# Patient Record
Sex: FEMALE | Race: WHITE | Employment: UNEMPLOYED | URBAN - METROPOLITAN AREA
[De-identification: names, ages, dates, MRNs, and addresses within clinical notes are randomized per-mention and may not be internally consistent; named-entity substitution may affect disease eponyms.]

---

## 2019-03-31 ENCOUNTER — APPOINTMENT (OUTPATIENT)
Dept: GENERAL RADIOLOGY | Age: 59
DRG: 872 | End: 2019-03-31
Payer: COMMERCIAL

## 2019-03-31 ENCOUNTER — HOSPITAL ENCOUNTER (INPATIENT)
Age: 59
LOS: 2 days | Discharge: HOME | DRG: 872 | End: 2019-04-02
Attending: STUDENT IN AN ORGANIZED HEALTH CARE EDUCATION/TRAINING PROGRAM | Admitting: INTERNAL MEDICINE
Payer: COMMERCIAL

## 2019-03-31 DIAGNOSIS — I95.9 HYPOTENSION, UNSPECIFIED HYPOTENSION TYPE: ICD-10-CM

## 2019-03-31 DIAGNOSIS — A41.9 SEPTICEMIA (HCC): ICD-10-CM

## 2019-03-31 DIAGNOSIS — D72.829 LEUKOCYTOSIS, UNSPECIFIED TYPE: ICD-10-CM

## 2019-03-31 DIAGNOSIS — N17.9 ACUTE RENAL FAILURE, UNSPECIFIED ACUTE RENAL FAILURE TYPE (HCC): Primary | ICD-10-CM

## 2019-03-31 LAB
ALBUMIN SERPL-MCNC: 3.1 G/DL (ref 3.5–4.6)
ALP BLD-CCNC: 114 U/L (ref 40–130)
ALT SERPL-CCNC: 14 U/L (ref 0–33)
ANION GAP SERPL CALCULATED.3IONS-SCNC: 15 MEQ/L (ref 9–15)
ANISOCYTOSIS: ABNORMAL
APTT: 34.3 SEC (ref 21.6–35.4)
AST SERPL-CCNC: 11 U/L (ref 0–35)
BACTERIA: ABNORMAL /HPF
BANDED NEUTROPHILS RELATIVE PERCENT: 8 % (ref 5–11)
BASOPHILS ABSOLUTE: 0 K/UL (ref 0–0.2)
BASOPHILS RELATIVE PERCENT: 0.1 %
BILIRUB SERPL-MCNC: 0.3 MG/DL (ref 0.2–0.7)
BILIRUBIN URINE: NEGATIVE
BLOOD, URINE: NEGATIVE
BUN BLDV-MCNC: 105 MG/DL (ref 8–23)
C-REACTIVE PROTEIN, HIGH SENSITIVITY: >300 MG/L (ref 0–5)
CALCIUM SERPL-MCNC: 11.6 MG/DL (ref 8.5–9.9)
CHLORIDE BLD-SCNC: 99 MEQ/L (ref 95–107)
CHOLESTEROL, TOTAL: 145 MG/DL (ref 0–199)
CLARITY: CLEAR
CO2: 19 MEQ/L (ref 20–31)
COLOR: YELLOW
CREAT SERPL-MCNC: 1.46 MG/DL (ref 0.5–0.9)
EKG ATRIAL RATE: 94 BPM
EKG P AXIS: 14 DEGREES
EKG P-R INTERVAL: 160 MS
EKG Q-T INTERVAL: 342 MS
EKG QRS DURATION: 112 MS
EKG QTC CALCULATION (BAZETT): 427 MS
EKG R AXIS: -25 DEGREES
EKG T AXIS: 33 DEGREES
EKG VENTRICULAR RATE: 94 BPM
EOSINOPHILS ABSOLUTE: 0 K/UL (ref 0–0.7)
EOSINOPHILS RELATIVE PERCENT: 0.7 %
EPITHELIAL CELLS, UA: ABNORMAL /HPF (ref 0–5)
GFR AFRICAN AMERICAN: 44.2
GFR NON-AFRICAN AMERICAN: 36.5
GLOBULIN: 4.3 G/DL (ref 2.3–3.5)
GLUCOSE BLD-MCNC: 105 MG/DL (ref 70–99)
GLUCOSE URINE: NEGATIVE MG/DL
HCT VFR BLD CALC: 31.1 % (ref 37–47)
HDLC SERPL-MCNC: 37 MG/DL (ref 40–59)
HEMOGLOBIN: 10.1 G/DL (ref 12–16)
HYALINE CASTS: ABNORMAL /HPF (ref 0–5)
HYPOCHROMIA: ABNORMAL
INR BLD: 1
KETONES, URINE: NEGATIVE MG/DL
LACTIC ACID, SEPSIS: 1.8 MMOL/L (ref 0.5–1.9)
LACTIC ACID: 1.3 MMOL/L (ref 0.5–2.2)
LDL CHOLESTEROL CALCULATED: 79 MG/DL (ref 0–129)
LEUKOCYTE ESTERASE, URINE: NEGATIVE
LYMPHOCYTES ABSOLUTE: 1.9 K/UL (ref 1–4.8)
LYMPHOCYTES RELATIVE PERCENT: 11 %
MAGNESIUM: 2.4 MG/DL (ref 1.7–2.4)
MCH RBC QN AUTO: 27.8 PG (ref 27–31.3)
MCHC RBC AUTO-ENTMCNC: 32.6 % (ref 33–37)
MCV RBC AUTO: 85.2 FL (ref 82–100)
MONOCYTES ABSOLUTE: 2.4 K/UL (ref 0.2–0.8)
MONOCYTES RELATIVE PERCENT: 11.4 %
NEUTROPHILS ABSOLUTE: 12.8 K/UL (ref 1.4–6.5)
NEUTROPHILS RELATIVE PERCENT: 68 %
NITRITE, URINE: POSITIVE
PDW BLD-RTO: 17.4 % (ref 11.5–14.5)
PH UA: 6.5 (ref 5–9)
PLATELET # BLD: 335 K/UL (ref 130–400)
PLATELET SLIDE REVIEW: ADEQUATE
POTASSIUM SERPL-SCNC: 2.9 MEQ/L (ref 3.4–4.9)
PRO-BNP: 449 PG/ML
PROMONOCYTES: 3 %
PROTEIN UA: ABNORMAL MG/DL
PROTHROMBIN TIME: 10.2 SEC (ref 9–11.5)
RBC # BLD: 3.65 M/UL (ref 4.2–5.4)
RBC UA: ABNORMAL /HPF (ref 0–5)
SLIDE REVIEW: ABNORMAL
SMUDGE CELLS: 2.9
SODIUM BLD-SCNC: 133 MEQ/L (ref 135–144)
SPECIFIC GRAVITY UA: 1.01 (ref 1–1.03)
TOTAL CK: 40 U/L (ref 0–170)
TOTAL PROTEIN: 7.4 G/DL (ref 6.3–8)
TRIGL SERPL-MCNC: 145 MG/DL (ref 0–150)
TROPONIN: <0.01 NG/ML (ref 0–0.01)
TSH SERPL DL<=0.05 MIU/L-ACNC: 0.6 UIU/ML (ref 0.44–3.86)
URINE REFLEX TO CULTURE: YES
UROBILINOGEN, URINE: 0.2 E.U./DL
WBC # BLD: 16.9 K/UL (ref 4.8–10.8)
WBC UA: ABNORMAL /HPF (ref 0–5)

## 2019-03-31 PROCEDURE — 80061 LIPID PANEL: CPT

## 2019-03-31 PROCEDURE — 83880 ASSAY OF NATRIURETIC PEPTIDE: CPT

## 2019-03-31 PROCEDURE — 86141 C-REACTIVE PROTEIN HS: CPT

## 2019-03-31 PROCEDURE — 36415 COLL VENOUS BLD VENIPUNCTURE: CPT

## 2019-03-31 PROCEDURE — 87186 SC STD MICRODIL/AGAR DIL: CPT

## 2019-03-31 PROCEDURE — 80053 COMPREHEN METABOLIC PANEL: CPT

## 2019-03-31 PROCEDURE — 87086 URINE CULTURE/COLONY COUNT: CPT

## 2019-03-31 PROCEDURE — 84484 ASSAY OF TROPONIN QUANT: CPT

## 2019-03-31 PROCEDURE — 85610 PROTHROMBIN TIME: CPT

## 2019-03-31 PROCEDURE — 2580000003 HC RX 258: Performed by: STUDENT IN AN ORGANIZED HEALTH CARE EDUCATION/TRAINING PROGRAM

## 2019-03-31 PROCEDURE — 96365 THER/PROPH/DIAG IV INF INIT: CPT

## 2019-03-31 PROCEDURE — 6360000002 HC RX W HCPCS: Performed by: STUDENT IN AN ORGANIZED HEALTH CARE EDUCATION/TRAINING PROGRAM

## 2019-03-31 PROCEDURE — 81001 URINALYSIS AUTO W/SCOPE: CPT

## 2019-03-31 PROCEDURE — 99284 EMERGENCY DEPT VISIT MOD MDM: CPT

## 2019-03-31 PROCEDURE — 2060000000 HC ICU INTERMEDIATE R&B

## 2019-03-31 PROCEDURE — 82550 ASSAY OF CK (CPK): CPT

## 2019-03-31 PROCEDURE — 85025 COMPLETE CBC W/AUTO DIFF WBC: CPT

## 2019-03-31 PROCEDURE — 85730 THROMBOPLASTIN TIME PARTIAL: CPT

## 2019-03-31 PROCEDURE — 2580000003 HC RX 258: Performed by: INTERNAL MEDICINE

## 2019-03-31 PROCEDURE — 83735 ASSAY OF MAGNESIUM: CPT

## 2019-03-31 PROCEDURE — 87040 BLOOD CULTURE FOR BACTERIA: CPT

## 2019-03-31 PROCEDURE — 6370000000 HC RX 637 (ALT 250 FOR IP): Performed by: INTERNAL MEDICINE

## 2019-03-31 PROCEDURE — 84443 ASSAY THYROID STIM HORMONE: CPT

## 2019-03-31 PROCEDURE — 6360000002 HC RX W HCPCS: Performed by: INTERNAL MEDICINE

## 2019-03-31 PROCEDURE — 93005 ELECTROCARDIOGRAM TRACING: CPT

## 2019-03-31 PROCEDURE — 71045 X-RAY EXAM CHEST 1 VIEW: CPT

## 2019-03-31 PROCEDURE — 83605 ASSAY OF LACTIC ACID: CPT

## 2019-03-31 PROCEDURE — 87077 CULTURE AEROBIC IDENTIFY: CPT

## 2019-03-31 RX ORDER — 0.9 % SODIUM CHLORIDE 0.9 %
1000 INTRAVENOUS SOLUTION INTRAVENOUS ONCE
Status: COMPLETED | OUTPATIENT
Start: 2019-03-31 | End: 2019-03-31

## 2019-03-31 RX ORDER — HYDROCODONE BITARTRATE AND ACETAMINOPHEN 7.5; 325 MG/1; MG/1
1 TABLET ORAL EVERY 6 HOURS PRN
COMMUNITY

## 2019-03-31 RX ORDER — DOXYCYCLINE HYCLATE 50 MG/1
324 CAPSULE, GELATIN COATED ORAL
Status: ON HOLD | COMMUNITY
End: 2019-04-02 | Stop reason: HOSPADM

## 2019-03-31 RX ORDER — HYDROCODONE BITARTRATE AND ACETAMINOPHEN 5; 325 MG/1; MG/1
1 TABLET ORAL EVERY 8 HOURS PRN
Status: DISCONTINUED | OUTPATIENT
Start: 2019-03-31 | End: 2019-04-02 | Stop reason: HOSPADM

## 2019-03-31 RX ORDER — SODIUM CHLORIDE 0.9 % (FLUSH) 0.9 %
10 SYRINGE (ML) INJECTION PRN
Status: DISCONTINUED | OUTPATIENT
Start: 2019-03-31 | End: 2019-04-02 | Stop reason: HOSPADM

## 2019-03-31 RX ORDER — POTASSIUM CHLORIDE 20 MEQ/1
40 TABLET, EXTENDED RELEASE ORAL ONCE
Status: COMPLETED | OUTPATIENT
Start: 2019-03-31 | End: 2019-03-31

## 2019-03-31 RX ORDER — BENAZEPRIL HYDROCHLORIDE 40 MG/1
40 TABLET, FILM COATED ORAL DAILY
COMMUNITY
Start: 2018-10-08 | End: 2019-04-07

## 2019-03-31 RX ORDER — SODIUM CHLORIDE 0.9 % (FLUSH) 0.9 %
10 SYRINGE (ML) INJECTION EVERY 12 HOURS SCHEDULED
Status: DISCONTINUED | OUTPATIENT
Start: 2019-03-31 | End: 2019-04-02 | Stop reason: HOSPADM

## 2019-03-31 RX ORDER — SODIUM CHLORIDE 9 MG/ML
INJECTION, SOLUTION INTRAVENOUS CONTINUOUS
Status: DISCONTINUED | OUTPATIENT
Start: 2019-03-31 | End: 2019-04-01

## 2019-03-31 RX ORDER — ZOLPIDEM TARTRATE 10 MG/1
TABLET ORAL NIGHTLY PRN
Status: ON HOLD | COMMUNITY
End: 2019-04-02 | Stop reason: HOSPADM

## 2019-03-31 RX ORDER — IBUPROFEN 200 MG
200 TABLET ORAL EVERY 6 HOURS PRN
Status: ON HOLD | COMMUNITY
End: 2019-04-02 | Stop reason: HOSPADM

## 2019-03-31 RX ORDER — HYDROCHLOROTHIAZIDE 25 MG/1
25 TABLET ORAL DAILY
Status: ON HOLD | COMMUNITY
Start: 2008-09-29 | End: 2019-04-02 | Stop reason: HOSPADM

## 2019-03-31 RX ADMIN — SODIUM CHLORIDE 1000 ML: 9 INJECTION, SOLUTION INTRAVENOUS at 13:41

## 2019-03-31 RX ADMIN — PIPERACILLIN SODIUM,TAZOBACTAM SODIUM 3.38 G: 3; .375 INJECTION, POWDER, FOR SOLUTION INTRAVENOUS at 13:41

## 2019-03-31 RX ADMIN — POTASSIUM CHLORIDE 40 MEQ: 20 TABLET, EXTENDED RELEASE ORAL at 17:35

## 2019-03-31 RX ADMIN — SODIUM CHLORIDE: 9 INJECTION, SOLUTION INTRAVENOUS at 17:36

## 2019-03-31 RX ADMIN — SODIUM CHLORIDE 1000 ML: 9 INJECTION, SOLUTION INTRAVENOUS at 15:04

## 2019-03-31 RX ADMIN — CEFTRIAXONE SODIUM 1 G: 1 INJECTION, POWDER, FOR SOLUTION INTRAMUSCULAR; INTRAVENOUS at 17:34

## 2019-03-31 RX ADMIN — ENOXAPARIN SODIUM 40 MG: 40 INJECTION SUBCUTANEOUS at 17:35

## 2019-03-31 RX ADMIN — SODIUM CHLORIDE 1000 ML: 9 INJECTION, SOLUTION INTRAVENOUS at 15:06

## 2019-03-31 RX ADMIN — HYDROCODONE BITARTRATE AND ACETAMINOPHEN 1 TABLET: 5; 325 TABLET ORAL at 19:00

## 2019-03-31 ASSESSMENT — ENCOUNTER SYMPTOMS
CHEST TIGHTNESS: 0
COUGH: 0
DIARRHEA: 0
SINUS PRESSURE: 0
TROUBLE SWALLOWING: 0
BACK PAIN: 0
ABDOMINAL PAIN: 0
SHORTNESS OF BREATH: 0
NAUSEA: 1
VOMITING: 1

## 2019-03-31 ASSESSMENT — PAIN SCALES - GENERAL
PAINLEVEL_OUTOF10: 0
PAINLEVEL_OUTOF10: 8

## 2019-03-31 NOTE — H&P
Department of Internal Medicine  History and Physical      CHIEF COMPLAINT: Fatigue (weakness. started a couple weeks ago. Pt had a similar event 2 months ago in [place]. Pt falling  asleep in chair.  )      Reason for Admission:  [facility] ([facility] [A41.9]    History Obtained From: Patient and chart review    HISTORY OF PRESENT ILLNESS:    The patient is a 59 y.o. female with significant past medical history of Hypertension, Insomnia, cervicalgia who lives in [place]. Patient and her  have been visiting their son. As per the son and  at bedside, patient has been progressively getting weak and lethargic for past 3-4 days, accompanied with nausea and vomiting. Patient denies any dysuria or frequency, chest pain, shortness of breath or cough, abdominal pain , diarrhea. She was admitted 2 months ago in [place] for confusion, ARISTEO, leukocytosis . No definite source of infection was found. But she was treated with hydration and abx and her condition stabilized. Past Medical History:    No past medical history on file. Past Surgical History:    No past surgical history on file. Medications Prior to Admission:    Not in a hospital admission. Allergies:  Patient has no known allergies. Social History:   Social History    None         Family History:   No family history on file. REVIEW OF SYSTEMS:  12 systems reviewed and are negative except as mentioned in HPI and A&P    PHYSICAL EXAM:  Vitals:  Vitals:    03/31/19 1550   BP: 112/76   Pulse: 98   Resp: 29   Temp:    SpO2:        Focal exam:      General Exam (except as mentioned above):  CONSTITUTIONAL: Awake, alert, no apparent distress  EYES:  PERRL, conjunctiva normal  ENT:  Normocephalic, atraumatic  NECK:  Supple  BACK:  Symmetric  LUNGS:  CTAB except bilateral basilar crackles. CARDIOVASCULAR:  S1S2 present  ABDOMEN:  soft, non-distended, non-tender  MUSCULOSKELETAL:  There is no redness, warmth, or swelling of the joints. NEUROLOGIC:  Alert, awake, oriented x 3. No FND  EXTREMITIES: Warm and well perfused. DATA:  LABS  Recent Labs     03/31/19  1324   WBC 16.9*   RBC 3.65*   HGB 10.1*   HCT 31.1*   MCV 85.2   MCH 27.8   MCHC 32.6*   RDW 17.4*          Recent Labs     03/31/19  1324   *   K 2.9*   CL 99   CO2 19*   *   CREATININE 1.46*   GLUCOSE 105*   CALCIUM 11.6*       Recent Labs     03/31/19  1324   MG 2.4         EKG:  I have reviewed the EKG     Radiology Review:  I have reviewed the imaging studies -     ASSESSMENT AND PLAN:    MINA/Fran Sy 1106 Problems    Diagnosis Date Noted    Sepsis ([facility] [A41.9] 03/31/2019     Lethargy, hypotension: secondary to sepsis. Patient had similar episode 2 months ago and no source of infection was found. This time patient does not have any symptoms except nausea and fatigue, UA is weakly suggestive of UTI. She has been taking 4 norco daily for her chronic pain, in addition to Ambien and Zanaflex which can be a part contributing to her lethargy and hypotension    Sepsis\" manifested with hypotension, tachycardia, leukocytosis, and ARISTEO. Start on Ceftriaxone. Follow up Blood and urine cultures. IVF. Pro calcitonin. ARISTEO    Hypokalemia: replaced    Chronic neck pain: Hold Norco due to hypotension     Home meds include: Lotensin, HCTZ.  Zanaflex, ambien and norco as needed    Joce Atkins MD  Pager : [phone]

## 2019-03-31 NOTE — ED NOTES
Lab department called to draw blood. Per Glenda Abraham, staff completing shift change, will send a tech. shortly.      Sydney Marrero RN  03/31/19 2770

## 2019-03-31 NOTE — FLOWSHEET NOTE
Admit Date: 3/31/2019  Hospital day 1    Subjective:     Patient to 180. Oriented to room AND call light. Family members at bedside. Admission in progress. Medication side effects:    Scheduled Meds:   sodium chloride flush  10 mL Intravenous 2 times per day    enoxaparin  40 mg Subcutaneous Daily    cefTRIAXone (ROCEPHIN) IV  1 g Intravenous Q24H     Continuous Infusions:   sodium chloride 100 mL/hr at 03/31/19 1736     PRN Meds:sodium chloride flush, HYDROcodone 5 mg - acetaminophen    Review of Systems  See flow sheet    Objective:     Patient Vitals for the past 8 hrs:   BP Temp Temp src Pulse Resp SpO2 Height Weight   03/31/19 1708 134/66 97.7 °F (36.5 °C) Oral 98 18 -- 5' 6\" (1.676 m) 200 lb (90.7 kg)   03/31/19 1550 112/76 -- -- 98 29 -- -- --   03/31/19 1540 -- -- -- -- -- 99 % -- --   03/31/19 1500 107/64 -- -- 93 16 -- -- --   03/31/19 1400 104/62 -- -- 87 15 95 % -- --   03/31/19 1330 (!) 86/58 -- -- 88 16 96 % -- --   03/31/19 1300 (!) 81/53 -- -- 91 8 98 % -- --   03/31/19 1228 83/62 97.7 °F (36.5 °C) Oral 96 14 95 % -- 202 lb (91.6 kg)     No intake/output data recorded. No intake/output data recorded. Telemetry:   Data Review  CBC:   Lab Results   Component Value Date    WBC 16.9 03/31/2019    RBC 3.65 03/31/2019     BMP:   Lab Results   Component Value Date    GLUCOSE 105 03/31/2019    CO2 19 03/31/2019     03/31/2019    CREATININE 1.46 03/31/2019    CALCIUM 11.6 03/31/2019       Assessment:     Active Problems:    Sepsis ([address] [facility]  Resolved Problems:    * No resolved hospital problems. *      Plan:     Admission in progress. Pt oriented to room and call light. Complaints of generalized weakness and fatigue. Noted pt to be restless and SOB with exertion. Pulse on  percent on room air. Home meds reviewed with pt and family. Few meds here will be taken home per family member. Iv fluids per order. Falls precautions initiated due to generalized weakness. Will order pt dinner. Electronically signed by Sumaya Wagner RN on 3/31/2019 at 6:01 PM

## 2019-03-31 NOTE — ED NOTES
Pt requesting assistance with toileting. Nurse removed pt's clothing. Pt placed on bedpan. Pt unable to urinate. Pt states she is uncomfortable and cold. Pt given x2 warm blankets and covered up. Pt asking for family. Pt requests to be taken off of bedpan. Family retrieved from [employer]. Pt requesting family assist her with toileting. Pt wishes to walk to BR. Pt is unstable and unable to follow commands.        Pranav Case RN  03/31/19 6508

## 2019-03-31 NOTE — ED PROVIDER NOTES
[facility] ED  eMERGENCY dEPARTMENT eNCOUnter      Pt Name: Terrie Franks  MRN: [id number]  Jarrodgfari 1960  Date of evaluation: 3/31/2019  Provider: Akash Howell, [address] [place] [address]       Chief Complaint   Patient presents with    Fatigue     weakness. started a couple weeks ago. Pt had a similar event 2 months ago in [place]. Pt falling  asleep in chair. HISTORY OF PRESENT ILLNESS   (Location/Symptom, Timing/Onset,Context/Setting, Quality, Duration, Modifying Factors, Severity)  Note limiting factors. Terrie Franks is a 59 y.o. female who presents to the emergency department with c/o of severe weakness. Patient is hypotensive with Systolic BP of 70. Patient denies fever or chills. Patient son relates 2 months ago in [place] that the patient developed renal failure and had a high white blood cell count. The patient's son states that she recovered from it but had some vomiting 2-3 days ago and has been complaining of fatigue since. Patient denies any fever or chills. She thought that she had gotten food poisoning. HPI    NursingNotes were reviewed. REVIEW OF SYSTEMS    (2-9 systems for level 4, 10 or more for level 5)     Review of Systems   Constitutional: Positive for activity change, appetite change and fatigue. Negative for chills, fever and unexpected weight change. HENT: Negative for drooling, ear pain, nosebleeds, sinus pressure and trouble swallowing. Respiratory: Negative for cough, chest tightness and shortness of breath. Cardiovascular: Negative for chest pain and leg swelling. Gastrointestinal: Positive for nausea and vomiting. Negative for abdominal pain and diarrhea. Endocrine: Negative for polydipsia and polyphagia. Genitourinary: Negative for dysuria, flank pain and frequency. Musculoskeletal: Negative for back pain and myalgias. Skin: Negative for pallor and rash. Neurological: Negative for syncope, weakness and headaches. Triage Vitals [03/31/19 1228]   BP Temp Temp Source Pulse Resp SpO2 Height Weight   83/62 97.7 °F (36.5 °C) Oral 96 14 95 % -- 202 lb (91.6 kg)       Physical Exam   Constitutional: She is oriented to person, place, and time. She appears well-developed and well-nourished. No distress. HENT:   Head: Normocephalic and atraumatic. Head is without Torres's sign. Right Ear: External ear normal.   Left Ear: External ear normal.   Nose: Nose normal.   Mouth/Throat: Uvula is midline and oropharynx is clear and moist. Mucous membranes are dry. No oropharyngeal exudate. Eyes: Pupils are equal, round, and reactive to light. Conjunctivae and EOM are normal. Right eye exhibits no discharge. No foreign body present in the right eye. Left eye exhibits no discharge and no exudate. No scleral icterus. Neck: Normal range of motion. Neck supple. No JVD present. No neck rigidity. No tracheal deviation present. No thyromegaly present. Cardiovascular: Normal rate, regular rhythm, normal heart sounds and intact distal pulses. Exam reveals no gallop, no distant heart sounds and no friction rub. No murmur heard. Pulmonary/Chest: Effort normal and breath sounds normal. No stridor. No respiratory distress. She has no wheezes. She has no rales. She exhibits no tenderness. Abdominal: Soft. Bowel sounds are normal. She exhibits no distension, no pulsatile liver, no ascites and no mass. There is no hepatosplenomegaly. There is no tenderness. There is no rebound and no guarding. Musculoskeletal: Normal range of motion. She exhibits no edema or tenderness. Lymphadenopathy:        Head (right side): No submental adenopathy present. Head (left side): No submental adenopathy present. Neurological: She is alert and oriented to person, place, and time. She has normal reflexes. No cranial nerve deficit or sensory deficit. She exhibits normal muscle tone. Coordination normal.   Skin: Skin is warm and dry.  Capillary refill takes less than 2 seconds. No rash noted. She is not diaphoretic. No erythema. Psychiatric: She has a normal mood and affect. Her behavior is normal. Judgment and thought content normal.   Nursing note and vitals reviewed. DIAGNOSTIC RESULTS     EKG: All EKG's are interpreted by the Emergency Department Physician who either signs or Co-signsthis chart in the absence of a cardiologist.    EKG: Normal sinus rhythm at 94 bpm, there is an intraventricular conduction delay, there is early R-wave transition in the precordial leads, the QT interval is 342 ms, no PVCs. RADIOLOGY:   Non-plain filmimages such as CT, Ultrasound and MRI are read by the radiologist. Plain radiographic images are visualized and preliminarily interpreted by the emergency physician with the below findings:    Chest x-ray:  No infiltrate, no ptx, no free air.       Interpretation per the Radiologist below, if available at the time ofthis note:    XR CHEST PORTABLE    (Results Pending)         ED BEDSIDE ULTRASOUND:   Performed by ED Physician - none    LABS:  Labs Reviewed   CBC WITH AUTO DIFFERENTIAL - Abnormal; Notable for the following components:       Result Value    WBC 16.9 (*)     RBC 3.65 (*)     Hemoglobin 10.1 (*)     Hematocrit 31.1 (*)     MCHC 32.6 (*)     RDW 17.4 (*)     Neutrophils # 12.8 (*)     Monocytes # 2.4 (*)     Promonocytes 3 (*)     All other components within normal limits   COMPREHENSIVE METABOLIC PANEL - Abnormal; Notable for the following components:    Sodium 133 (*)     Potassium 2.9 (*)     CO2 19 (*)     Glucose 105 (*)      (*)     CREATININE 1.46 (*)     GFR Non- 36.5 (*)     GFR  44.2 (*)     Calcium 11.6 (*)     Alb 3.1 (*)     Globulin 4.3 (*)     All other components within normal limits    Narrative:     CALL  [facility]  ED tel. [phone],  Bun and K+ called to Dr. Indu Candelario in E.R., 03/31/2019 14:06, by Sander Damon   HIGH SENSITIVITY CRP - Abnormal; Notable for the following components:    CRP High Sensitivity >300.0 (*)     All other components within normal limits   LIPID PANEL - Abnormal; Notable for the following components:    HDL 37 (*)     All other components within normal limits    Narrative:     CALL  [facility]  ED tel. [phone],  Bun and K+ called to Dr. Ronny Huynh in E.R., 03/31/2019 14:06, by Parisa Aguillon   URINE RT REFLEX TO CULTURE - Abnormal; Notable for the following components:    Protein, UA TRACE (*)     Nitrite, Urine POSITIVE (*)     All other components within normal limits   MICROSCOPIC URINALYSIS - Abnormal; Notable for the following components:    Bacteria, UA MANY (*)     All other components within normal limits   CULTURE BLOOD #1   CULTURE BLOOD #2   URINE CULTURE   APTT   BRAIN NATRIURETIC PEPTIDE   CK    Narrative:     CALL  [facility]  ED tel. [phone],  Bun and K+ called to Dr. Ronny Huynh in E.R., 03/31/2019 14:06, by Parisa Aguillon   LACTIC ACID, PLASMA   MAGNESIUM    Narrative:     Trae Lynn  ED tel. [phone],  Bun and K+ called to Dr. Ronny Huynh in E.R., 03/31/2019 14:06, by Remus Race   TROPONIN   TSH WITHOUT REFLEX       All other labs were within normal range or not returned as of this dictation. EMERGENCY DEPARTMENT COURSE and DIFFERENTIAL DIAGNOSIS/MDM:   Vitals:    Vitals:    03/31/19 1228 03/31/19 1330   BP: 83/62 (!) 86/58   Pulse: 96 88   Resp: 14 16   Temp: 97.7 °F (36.5 °C)    TempSrc: Oral    SpO2: 95% 96%   Weight: 202 lb (91.6 kg)          MDM  IV fluid bolus, IV zosyn. Re-exam systolic 886. Concern for sepsis. Blood urea oxygen is 105 indicating acute renal failure. On reexam the ER physician discussed findings with patient and her family. Patient will be admitted the hospital for further care. Dr. Ronny Huynh teleconference with Dr. Piotr Flowers who is accepted the patient to her service. CRITICAL CARE TIME   Total Critical Care time was 37 minutes, excluding separately reportableprocedures.   There was a high probability of clinicallysignificant/life threatening deterioration in the patient's condition which required my urgent intervention. CONSULTS:  IP CONSULT TO HOSPITALIST    PROCEDURES:  Unless otherwise noted below, none     Procedures    FINAL IMPRESSION      1. Acute renal failure, unspecified acute renal failure type ([facility]    2. Hypotension, unspecified hypotension type    3. Septicemia ([facility]    4. Leukocytosis, unspecified type          DISPOSITION/PLAN   DISPOSITION Admitted 03/31/2019 03:31:47 PM      PATIENT REFERRED TO:  No follow-up provider specified.     DISCHARGE MEDICATIONS:  New Prescriptions    No medications on file          (Please note that portions of this note were completed with a voice recognition program.  Efforts were made to edit the dictations but occasionally words are mis-transcribed.)    Carmen Cuellar DO (electronically signed)  Attending Emergency Physician         Carmen Cuellar DO  03/31/19 1535

## 2019-03-31 NOTE — ED TRIAGE NOTES
Pt c/o fatigue. Pt arrived from [place] March 27, 2019. Pt's family believed she was fatigued due to time zone change/jet lag. Pt experienced weakness and increased sleepiness. Family increased pt's fluid intake. Symptoms remain unchanged. Pt had similar event a couple of months ago and hospitalized. Pt is drowsy. Arouses to verbal stimuli. Pt assisted into bed. DAVIS. Denies pain.

## 2019-04-01 LAB
ANION GAP SERPL CALCULATED.3IONS-SCNC: 19 MEQ/L (ref 9–15)
BASOPHILS ABSOLUTE: 0 K/UL (ref 0–0.2)
BASOPHILS RELATIVE PERCENT: 0.2 %
BUN BLDV-MCNC: 54 MG/DL (ref 8–23)
CALCIUM SERPL-MCNC: 9.4 MG/DL (ref 8.5–9.9)
CHLORIDE BLD-SCNC: 107 MEQ/L (ref 95–107)
CO2: 13 MEQ/L (ref 20–31)
CREAT SERPL-MCNC: 0.76 MG/DL (ref 0.5–0.9)
EOSINOPHILS ABSOLUTE: 0.1 K/UL (ref 0–0.7)
EOSINOPHILS RELATIVE PERCENT: 0.5 %
GFR AFRICAN AMERICAN: >60
GFR NON-AFRICAN AMERICAN: >60
GLUCOSE BLD-MCNC: 99 MG/DL (ref 70–99)
HCT VFR BLD CALC: 32.1 % (ref 37–47)
HEMOGLOBIN: 10.2 G/DL (ref 12–16)
LYMPHOCYTES ABSOLUTE: 2.7 K/UL (ref 1–4.8)
LYMPHOCYTES RELATIVE PERCENT: 16.9 %
MAGNESIUM: 1.8 MG/DL (ref 1.7–2.4)
MCH RBC QN AUTO: 28.6 PG (ref 27–31.3)
MCHC RBC AUTO-ENTMCNC: 31.8 % (ref 33–37)
MCV RBC AUTO: 89.9 FL (ref 82–100)
MONOCYTES ABSOLUTE: 2.2 K/UL (ref 0.2–0.8)
MONOCYTES RELATIVE PERCENT: 13.7 %
NEUTROPHILS ABSOLUTE: 11.2 K/UL (ref 1.4–6.5)
NEUTROPHILS RELATIVE PERCENT: 68.7 %
PDW BLD-RTO: 18.2 % (ref 11.5–14.5)
PLATELET # BLD: 306 K/UL (ref 130–400)
POTASSIUM REFLEX MAGNESIUM: 2.8 MEQ/L (ref 3.4–4.9)
RBC # BLD: 3.57 M/UL (ref 4.2–5.4)
SODIUM BLD-SCNC: 139 MEQ/L (ref 135–144)
WBC # BLD: 16.2 K/UL (ref 4.8–10.8)

## 2019-04-01 PROCEDURE — 6370000000 HC RX 637 (ALT 250 FOR IP): Performed by: INTERNAL MEDICINE

## 2019-04-01 PROCEDURE — 85025 COMPLETE CBC W/AUTO DIFF WBC: CPT

## 2019-04-01 PROCEDURE — 6360000002 HC RX W HCPCS: Performed by: INTERNAL MEDICINE

## 2019-04-01 PROCEDURE — 97161 PT EVAL LOW COMPLEX 20 MIN: CPT

## 2019-04-01 PROCEDURE — 2060000000 HC ICU INTERMEDIATE R&B

## 2019-04-01 PROCEDURE — 2580000003 HC RX 258: Performed by: INTERNAL MEDICINE

## 2019-04-01 PROCEDURE — 80048 BASIC METABOLIC PNL TOTAL CA: CPT

## 2019-04-01 PROCEDURE — 83735 ASSAY OF MAGNESIUM: CPT

## 2019-04-01 PROCEDURE — 36415 COLL VENOUS BLD VENIPUNCTURE: CPT

## 2019-04-01 RX ORDER — DOXYCYCLINE HYCLATE 50 MG/1
324 CAPSULE, GELATIN COATED ORAL EVERY OTHER DAY
Status: DISCONTINUED | OUTPATIENT
Start: 2019-04-01 | End: 2019-04-02 | Stop reason: HOSPADM

## 2019-04-01 RX ORDER — HYDRALAZINE HYDROCHLORIDE 20 MG/ML
20 INJECTION INTRAMUSCULAR; INTRAVENOUS EVERY 4 HOURS PRN
Status: DISCONTINUED | OUTPATIENT
Start: 2019-04-01 | End: 2019-04-02 | Stop reason: HOSPADM

## 2019-04-01 RX ORDER — POTASSIUM CHLORIDE 20 MEQ/1
40 TABLET, EXTENDED RELEASE ORAL EVERY 4 HOURS
Status: COMPLETED | OUTPATIENT
Start: 2019-04-01 | End: 2019-04-01

## 2019-04-01 RX ORDER — HYDROCHLOROTHIAZIDE 25 MG/1
25 TABLET ORAL DAILY
Status: DISCONTINUED | OUTPATIENT
Start: 2019-04-01 | End: 2019-04-01

## 2019-04-01 RX ORDER — HYDRALAZINE HYDROCHLORIDE 20 MG/ML
10 INJECTION INTRAMUSCULAR; INTRAVENOUS EVERY 4 HOURS PRN
Status: DISCONTINUED | OUTPATIENT
Start: 2019-04-01 | End: 2019-04-02 | Stop reason: HOSPADM

## 2019-04-01 RX ORDER — LISINOPRIL 20 MG/1
40 TABLET ORAL DAILY
Status: DISCONTINUED | OUTPATIENT
Start: 2019-04-01 | End: 2019-04-01

## 2019-04-01 RX ADMIN — FERROUS GLUCONATE 324 MG: 324 TABLET ORAL at 11:35

## 2019-04-01 RX ADMIN — Medication 10 ML: at 16:29

## 2019-04-01 RX ADMIN — ENOXAPARIN SODIUM 40 MG: 40 INJECTION SUBCUTANEOUS at 08:32

## 2019-04-01 RX ADMIN — POTASSIUM CHLORIDE 40 MEQ: 1500 TABLET, EXTENDED RELEASE ORAL at 09:28

## 2019-04-01 RX ADMIN — HYDROCODONE BITARTRATE AND ACETAMINOPHEN 1 TABLET: 5; 325 TABLET ORAL at 03:24

## 2019-04-01 RX ADMIN — LISINOPRIL 40 MG: 20 TABLET ORAL at 11:35

## 2019-04-01 RX ADMIN — HYDROCHLOROTHIAZIDE 25 MG: 25 TABLET ORAL at 11:35

## 2019-04-01 RX ADMIN — CEFTRIAXONE SODIUM 1 G: 1 INJECTION, POWDER, FOR SOLUTION INTRAMUSCULAR; INTRAVENOUS at 16:27

## 2019-04-01 RX ADMIN — POTASSIUM CHLORIDE 40 MEQ: 1500 TABLET, EXTENDED RELEASE ORAL at 13:20

## 2019-04-01 RX ADMIN — HYDROCODONE BITARTRATE AND ACETAMINOPHEN 1 TABLET: 5; 325 TABLET ORAL at 11:35

## 2019-04-01 RX ADMIN — MELATONIN TAB 3 MG 5 MG: 3 TAB at 21:19

## 2019-04-01 RX ADMIN — HYDROCODONE BITARTRATE AND ACETAMINOPHEN 1 TABLET: 5; 325 TABLET ORAL at 21:19

## 2019-04-01 RX ADMIN — SODIUM CHLORIDE: 9 INJECTION, SOLUTION INTRAVENOUS at 04:17

## 2019-04-01 ASSESSMENT — PAIN SCALES - GENERAL
PAINLEVEL_OUTOF10: 7
PAINLEVEL_OUTOF10: 8
PAINLEVEL_OUTOF10: 0
PAINLEVEL_OUTOF10: 5

## 2019-04-01 NOTE — PROGRESS NOTES
Hospitalist Daily Progress Note  Name: Danielle Collier  Age: 59 y.o. Gender: female  CodeStatus: Full Code  Allergies: No Known Allergies    Chief Complaint:Fatigue (weakness. started a couple weeks ago. Pt had a similar event 2 months ago in [place]. Pt falling  asleep in chair.  )    Primary Care Provider: No primary care provider on file. InpatientTreatment Team: Treatment Team: Attending Provider: Belem Astorga DO; Consulting Physician: Kathia Navarrete MD; Registered Nurse: Charles Camara, RN; Registered Nurse: Alayna Curry, RN; Registered Nurse: Shirley Shepard RN  Admission Date: 3/31/2019      Subjective: Pt seen and evaluated at bedside. Feels energy level improved. Continued urinary frequency and burning. Afebrile. Vitals stable. More awake per . Hypertensive today. Hypokalemic today, repleted with 80 mEq. Physical Exam   Constitutional: She is oriented to person, place, and time. She appears well-developed and well-nourished. HENT:   Head: Normocephalic and atraumatic. Mouth/Throat: Oropharynx is clear and moist.   Eyes: Pupils are equal, round, and reactive to light. Conjunctivae and EOM are normal.   Cardiovascular: Normal rate, regular rhythm and normal heart sounds. Pulmonary/Chest: Effort normal and breath sounds normal. No stridor. She has no wheezes. Abdominal: Soft. She exhibits distension. There is tenderness. There is guarding. Neurological: She is alert and oriented to person, place, and time. No cranial nerve deficit. Psychiatric: She has a normal mood and affect. Her behavior is normal.       Review of Systems   Reason unable to perform ROS: 14 point ROS reviewed and negative except for as above.        Medications:  Reviewed    Infusion Medications:   Scheduled Medications:    hydrochlorothiazide  25 mg Oral Daily    lisinopril  40 mg Oral Daily    ferrous gluconate  324 mg Oral Every Other Day    sodium chloride flush  10 mL Intravenous 2 times per day    enoxaparin  40 mg Subcutaneous Daily    cefTRIAXone (ROCEPHIN) IV  1 g Intravenous Q24H     PRN Meds: sodium chloride flush, HYDROcodone 5 mg - acetaminophen    Labs:   Recent Labs     03/31/19  1324 04/01/19  0636   WBC 16.9* 16.2*   HGB 10.1* 10.2*   HCT 31.1* 32.1*    306     Recent Labs     03/31/19  1324 04/01/19  0636   * 139   K 2.9* 2.8*   CL 99 107   CO2 19* 13*   * 54*   CREATININE 1.46* 0.76   CALCIUM 11.6* 9.4     Recent Labs     03/31/19  1324   AST 11   ALT 14   BILITOT 0.3   ALKPHOS 114     Recent Labs     03/31/19  1324   INR 1.0     Recent Labs     03/31/19  1324   CKTOTAL 40   TROPONINI <0.010       Urinalysis:   Lab Results   Component Value Date    NITRU POSITIVE 03/31/2019    WBCUA 0-2 03/31/2019    BACTERIA MANY 03/31/2019    RBCUA 0-2 03/31/2019    BLOODU Negative 03/31/2019    SPECGRAV 1.015 03/31/2019    GLUCOSEU Negative 03/31/2019       Radiology:   Most recent    Chest CT      WITH CONTRAST:No results found for this or any previous visit. WITHOUT CONTRAST: No results found for this or any previous visit. CXR      2-view: No results found for this or any previous visit. Portable:   Results for orders placed during the hospital encounter of 03/31/19   XR CHEST PORTABLE    Narrative EXAMINATION: XR CHEST PORTABLE    CLINICAL HISTORY: HYPOTENSION WITH GENERALIZED WEAKNESS    COMPARISONS: None available. FINDINGS: Narrowing of distance between the humeral heads and acromion bilaterally. Narrowing distance acromioclavicular joints bilaterally. Cardiopericardial silhouette normal. Pulmonary vasculature normal. Right lung clear blunting left costophrenic   angle with ill-defined area increased opacity left lower lung. Impression SMALL LEFT EFFUSION WITH LEFT LOWER LUNG ATELECTASIS/PNEUMONIA. BILATERAL DEGENERATIVE CHANGES OF THE SHOULDERS PROBABLE BILATERAL ROTATOR CUFF INSUFFICIENCY.        Echo No results found for this or any previous visit. Assessment/Plan:    Active Hospital Problems    Diagnosis Date Noted    Sepsis ([facility] [A41.9] 03/31/2019     Sepsis due to UTI POA: continue ceftriaxone. Sepsis resolved. Awaiting culture and sensitivity. GNR on culture so far. ARISTEO: resolved. Hold IVF due to Hypertension. Hold hepatotoxic agents. Hypokalemia: Supplemented. Repeat Mag and K level in AM.     DVT PPX lovenox. Additional work up or/and treatment plan may be added today or then after based on clinical progression. I am managing a portion of pt care. Some medical issues are handled byother specialists. Additional work up and treatment should be done in out pt setting by pt PCP and other out pt providers. In addition to examining and evaluating pt, I spent additional time explaining care, normaland abnormal findings, and treatment plan. All of pt questions were answered. Counseling, diet and education were provided. Case will be discussed with nursing staff when appropriate. Family will be updated if and whenappropriate.       Electronically signed by Gayla Boyle DO on 4/1/2019 at 6:20 PM

## 2019-04-01 NOTE — FLOWSHEET NOTE
Assist to bathroom frequently. Assist x1. Gait unsteady a ttimes. Iv fluids maintained. Fall precautions maintained.  Electronically signed by Sumaya Wagner RN on 3/31/2019 at 11:09 PM

## 2019-04-01 NOTE — FLOWSHEET NOTE
Call from monitor tech-pt's HR up to 180s but did not sustain. Pt moving around in bed, asymptomatic. Will continue to monitor.  Electronically signed by Paul Larson RN on 4/1/2019 at 5:58 AM

## 2019-04-01 NOTE — FLOWSHEET NOTE
Prn norco for complaints of back and stomach discomfort 8/10.  Electronically signed by Taryn Garza RN on 4/1/2019 at 3:25 AM

## 2019-04-01 NOTE — PROGRESS NOTES
Physical Therapy Med Surg Initial Assessment  Facility/Department: [facility] TELEMETRY  Room: B405/K526-27       NAME: Nneka Goodrich  : 1960 (59 y.o.)  MRN: [id number]  CODE STATUS: Full Code    Date of Service: 2019    Patient Diagnosis(es): [facility] [A41.9]  Sepsis [facility]) [A41.9]   Chief Complaint   Patient presents with    Fatigue     weakness. started a couple weeks ago. Pt had a similar event 2 months ago in [place]. Pt falling  asleep in chair. Patient Active Problem List    Diagnosis Date Noted    [facility] 2019        Past Medical History:   Diagnosis Date    Chronic kidney disease     Hyperlipidemia      Past Surgical History:   Procedure Laterality Date    CHOLECYSTECTOMY      SMALL INTESTINE SURGERY         Chart Reviewed: Yes  Patient assessed for rehabilitation services?: Yes  General Comment  Comments: Pt agreeable to PT evaluation. Restrictions:  Restrictions/Precautions: Fall Risk     SUBJECTIVE: Subjective: \"I have bad knees. \"       Post Treatment Pain Screening:   Pain Screening  Patient Currently in Pain: No  Pain Assessment  Pain Assessment: 0-10  Pain Level: 0    Prior Level of Function:  Social/Functional History  Lives With: Spouse  Type of Home: House  Home Layout: One level  Home Access: Stairs to enter with rails  Entrance Stairs - Number of Steps: 4  Entrance Stairs - Rails: Both  Home Equipment: Rolling walker, Cane  ADL Assistance: Independent  Ambulation Assistance: Independent(without AD)  Transfer Assistance: Independent  Additional Comments: patient is from [address] [place] [address] S:   Vision/Hearing:  Vision: Impaired  Vision Exceptions: Wears glasses at all times  Hearing: Within functional limits    Cognition:  Overall Orientation Status: Within Functional Limits  Follows Commands: Within Functional Limits    Observation/Palpation  Posture: Fair(rounded shoulders)    ROM:  RLE General AROM: WFLs except limited knee ROM  LLE General AROM:

## 2019-04-02 VITALS
WEIGHT: 200 LBS | BODY MASS INDEX: 32.14 KG/M2 | DIASTOLIC BLOOD PRESSURE: 77 MMHG | HEART RATE: 95 BPM | TEMPERATURE: 97.8 F | SYSTOLIC BLOOD PRESSURE: 133 MMHG | RESPIRATION RATE: 19 BRPM | HEIGHT: 66 IN | OXYGEN SATURATION: 98 %

## 2019-04-02 LAB
ANION GAP SERPL CALCULATED.3IONS-SCNC: 16 MEQ/L (ref 9–15)
BASOPHILS ABSOLUTE: 0 K/UL (ref 0–0.2)
BASOPHILS RELATIVE PERCENT: 0.2 %
BUN BLDV-MCNC: 23 MG/DL (ref 8–23)
CALCIUM SERPL-MCNC: 9.5 MG/DL (ref 8.5–9.9)
CHLORIDE BLD-SCNC: 99 MEQ/L (ref 95–107)
CO2: 16 MEQ/L (ref 20–31)
CREAT SERPL-MCNC: 0.59 MG/DL (ref 0.5–0.9)
EOSINOPHILS ABSOLUTE: 0.1 K/UL (ref 0–0.7)
EOSINOPHILS RELATIVE PERCENT: 0.8 %
GFR AFRICAN AMERICAN: >60
GFR NON-AFRICAN AMERICAN: >60
GLUCOSE BLD-MCNC: 99 MG/DL (ref 70–99)
HCT VFR BLD CALC: 30.2 % (ref 37–47)
HEMOGLOBIN: 9.8 G/DL (ref 12–16)
LYMPHOCYTES ABSOLUTE: 3.9 K/UL (ref 1–4.8)
LYMPHOCYTES RELATIVE PERCENT: 22.5 %
MAGNESIUM: 1.5 MG/DL (ref 1.7–2.4)
MCH RBC QN AUTO: 27.5 PG (ref 27–31.3)
MCHC RBC AUTO-ENTMCNC: 32.3 % (ref 33–37)
MCV RBC AUTO: 85 FL (ref 82–100)
MONOCYTES ABSOLUTE: 1.5 K/UL (ref 0.2–0.8)
MONOCYTES RELATIVE PERCENT: 8.4 %
NEUTROPHILS ABSOLUTE: 11.8 K/UL (ref 1.4–6.5)
NEUTROPHILS RELATIVE PERCENT: 68.1 %
ORGANISM: ABNORMAL
PDW BLD-RTO: 17.3 % (ref 11.5–14.5)
PLATELET # BLD: 338 K/UL (ref 130–400)
POTASSIUM REFLEX MAGNESIUM: 3.4 MEQ/L (ref 3.4–4.9)
RBC # BLD: 3.56 M/UL (ref 4.2–5.4)
REASON FOR REJECTION: NORMAL
REJECTED TEST: NORMAL
SODIUM BLD-SCNC: 131 MEQ/L (ref 135–144)
URINE CULTURE, ROUTINE: ABNORMAL
URINE CULTURE, ROUTINE: ABNORMAL
WBC # BLD: 17.4 K/UL (ref 4.8–10.8)

## 2019-04-02 PROCEDURE — 2580000003 HC RX 258: Performed by: INTERNAL MEDICINE

## 2019-04-02 PROCEDURE — 6370000000 HC RX 637 (ALT 250 FOR IP): Performed by: INTERNAL MEDICINE

## 2019-04-02 PROCEDURE — 83735 ASSAY OF MAGNESIUM: CPT

## 2019-04-02 PROCEDURE — 36415 COLL VENOUS BLD VENIPUNCTURE: CPT

## 2019-04-02 PROCEDURE — 80048 BASIC METABOLIC PNL TOTAL CA: CPT

## 2019-04-02 PROCEDURE — 85025 COMPLETE CBC W/AUTO DIFF WBC: CPT

## 2019-04-02 PROCEDURE — 93010 ELECTROCARDIOGRAM REPORT: CPT | Performed by: INTERNAL MEDICINE

## 2019-04-02 PROCEDURE — 6360000002 HC RX W HCPCS: Performed by: INTERNAL MEDICINE

## 2019-04-02 RX ORDER — DOXYCYCLINE HYCLATE 50 MG/1
324 CAPSULE, GELATIN COATED ORAL EVERY OTHER DAY
Qty: 30 TABLET | Refills: 0 | Status: SHIPPED | OUTPATIENT
Start: 2019-04-03

## 2019-04-02 RX ORDER — AMLODIPINE BESYLATE 10 MG/1
10 TABLET ORAL DAILY
Qty: 30 TABLET | Refills: 0 | Status: SHIPPED | OUTPATIENT
Start: 2019-04-02

## 2019-04-02 RX ORDER — SULFAMETHOXAZOLE AND TRIMETHOPRIM 400; 80 MG/1; MG/1
1 TABLET ORAL 2 TIMES DAILY
Qty: 6 TABLET | Refills: 0 | Status: SHIPPED | OUTPATIENT
Start: 2019-04-02 | End: 2019-04-05

## 2019-04-02 RX ADMIN — Medication 10 ML: at 08:22

## 2019-04-02 RX ADMIN — ENOXAPARIN SODIUM 40 MG: 40 INJECTION SUBCUTANEOUS at 08:21

## 2019-04-02 RX ADMIN — HYDROCODONE BITARTRATE AND ACETAMINOPHEN 1 TABLET: 5; 325 TABLET ORAL at 08:44

## 2019-04-02 RX ADMIN — MAGNESIUM OXIDE TAB 400 MG (241.3 MG ELEMENTAL MG) 400 MG: 400 (241.3 MG) TAB at 11:06

## 2019-04-02 ASSESSMENT — PAIN SCALES - GENERAL: PAINLEVEL_OUTOF10: 7

## 2019-04-02 NOTE — PROGRESS NOTES
Physical Therapy  Facility/Department: [facility] M378/A531-07  Physical Therapy Discharge      NAME: [facility]    : 1960 (59 y.o.)  MRN: [id number]    Account: [de-identified]  Gender: female      Patient has been discharged from acute care hospital. DC patient from current PT program.      Electronically signed by Silvia Lizarraga PT on 19 at 3:09 PM

## 2019-04-02 NOTE — DISCHARGE SUMMARY
tone. Nl Comprehension, Alert,awake and oriented. Psychiatric: Alert and oriented, thought content appropriate, normal insight  Peripheral Pulses: +2 palpable, equal bilaterally    Significant Diagnostic Studies: CXR  Impression   SMALL LEFT EFFUSION WITH LEFT LOWER LUNG ATELECTASIS/PNEUMONIA. BILATERAL DEGENERATIVE CHANGES OF THE SHOULDERS PROBABLE BILATERAL ROTATOR CUFF INSUFFICIENCY. Discharge Medications:       Naomi Ontiveros, [address] Medication Instructions TGB:[id number]    Printed on:04/02/19 8511   Medication Information                      amLODIPine (NORVASC) 10 MG tablet  Take 1 tablet by mouth daily             benazepril (LOTENSIN) 40 MG tablet  Take 40 mg by mouth daily             ferrous gluconate (FERGON) 324 (38 Fe) MG tablet  Take 1 tablet by mouth every other day             HYDROcodone-acetaminophen (NORCO) 7.5-325 MG per tablet  Take 1 tablet by mouth every 6 hours as needed for Pain.             magnesium oxide (MAG-OX) 400 (241.3 Mg) MG TABS tablet  Take 1 tablet by mouth daily             sulfamethoxazole-trimethoprim (BACTRIM) 400-80 MG per tablet  Take 1 tablet by mouth 2 times daily for 3 days                 Disposition:   Discharged to Home. Any C needs that were indicated and/or required as been addressed and set up by Social Work. Condition at discharge: Pt was medically stable at the time of discharge. Activity: activity as tolerated    Total time taken for discharging this patient: 40 minutes. Greater than 70% of time was spent focused exclusively on this patient. Time was taken to review chart, discuss plans with consultants, reconciling medications, discussing plan answering questions with patient.      Roberto Bee  4/2/2019, 9:55 AM

## 2019-04-05 LAB
BLOOD CULTURE, ROUTINE: NORMAL
CULTURE, BLOOD 2: NORMAL